# Patient Record
Sex: FEMALE | Race: WHITE | Employment: PART TIME | URBAN - METROPOLITAN AREA
[De-identification: names, ages, dates, MRNs, and addresses within clinical notes are randomized per-mention and may not be internally consistent; named-entity substitution may affect disease eponyms.]

---

## 2018-06-12 ENCOUNTER — OFFICE VISIT (OUTPATIENT)
Dept: DERMATOLOGY | Age: 23
End: 2018-06-12
Payer: COMMERCIAL

## 2018-06-12 VITALS
HEART RATE: 77 BPM | BODY MASS INDEX: 31.93 KG/M2 | WEIGHT: 180.2 LBS | OXYGEN SATURATION: 98 % | SYSTOLIC BLOOD PRESSURE: 112 MMHG | HEIGHT: 63 IN | DIASTOLIC BLOOD PRESSURE: 74 MMHG

## 2018-06-12 DIAGNOSIS — L70.0 ACNE VULGARIS: Primary | ICD-10-CM

## 2018-06-12 PROCEDURE — 99202 OFFICE O/P NEW SF 15 MIN: CPT | Performed by: DERMATOLOGY

## 2018-06-12 RX ORDER — TRETINOIN 0.5 MG/G
CREAM TOPICAL
Qty: 45 G | Refills: 3 | Status: SHIPPED | OUTPATIENT
Start: 2018-06-12 | End: 2018-09-12 | Stop reason: SDUPTHER

## 2018-09-12 ENCOUNTER — OFFICE VISIT (OUTPATIENT)
Dept: DERMATOLOGY | Age: 23
End: 2018-09-12
Payer: COMMERCIAL

## 2018-09-12 VITALS
HEART RATE: 84 BPM | DIASTOLIC BLOOD PRESSURE: 65 MMHG | RESPIRATION RATE: 16 BRPM | BODY MASS INDEX: 30.59 KG/M2 | OXYGEN SATURATION: 98 % | WEIGHT: 179.2 LBS | HEIGHT: 64 IN | SYSTOLIC BLOOD PRESSURE: 99 MMHG

## 2018-09-12 DIAGNOSIS — L70.0 ACNE VULGARIS: Primary | ICD-10-CM

## 2018-09-12 PROCEDURE — 99213 OFFICE O/P EST LOW 20 MIN: CPT | Performed by: DERMATOLOGY

## 2018-09-12 RX ORDER — TRETINOIN 0.5 MG/G
CREAM TOPICAL
Qty: 45 G | Refills: 11 | Status: SHIPPED | OUTPATIENT
Start: 2018-09-12

## 2018-09-12 NOTE — PATIENT INSTRUCTIONS
Your Acne Treatment Prescribed by your Doctor: It is important to remember that oil glands respond very slowly and your skin will not change overnight. Your skin may get worse during the first weeks of treatment because all of the clogged pores are opening to the surface. Try not to get frustrated with your skin's slow response. Try to be consistent and follow these instructions from your doctor. If your acne has not responded to these treatments in 2-3 months, your doctor may recommend other medications. MORNING  Wash your face and other areas of acne with Benzoyl peroxide    Apply Tretinoin  to areas of acne in a thin layer. May include face, shoulders, back, and chest.    NIGHT  Wash your face and other areas of acne with Benzoyl peroxide    Apply benzoyl peroxide to areas of acne in a thin layer. May include face, shoulders, back, and chest.      It is important to apply the right topical medication (cream or gel) at the right time of the day, so please follow the instructions written above. Products with benzoyl peroxide in them can bleach towels and clothes, so use them carefully. It is important to avoid moisturizers, make-up, hair products and sunscreens made with oil. These products can contribute to acne breakouts by plugging your pores. Look for \"oil-free\" and \"non-comedogenic\" products. Many brands such as Neutrogena, Aveeno, Cetaphil, Purpose, Eucerin and Olay make moisturizers and sunscreens that are oil-free. Washing your Face:  Wash your face 2 times a day with a mild soap or prescribed facial cleanser. Be gentle in washing your face and follow these steps:  Splash water onto your face  Lather the soap in your hands and gently rub onto your face. You do not need to use a washcloth. Splash the face to rinse off the cleanser. Pat your face dry with a towel and, if it is time to, apply your prescription medication. Avoid Astringents.     Please have your pharmacist call our